# Patient Record
Sex: MALE
[De-identification: names, ages, dates, MRNs, and addresses within clinical notes are randomized per-mention and may not be internally consistent; named-entity substitution may affect disease eponyms.]

---

## 2024-03-26 ENCOUNTER — APPOINTMENT (OUTPATIENT)
Dept: OPHTHALMOLOGY | Facility: CLINIC | Age: 64
End: 2024-03-26
Payer: MEDICAID

## 2024-03-26 ENCOUNTER — NON-APPOINTMENT (OUTPATIENT)
Age: 64
End: 2024-03-26

## 2024-03-26 ENCOUNTER — EMERGENCY (EMERGENCY)
Facility: HOSPITAL | Age: 64
LOS: 1 days | Discharge: LEFT BEFORE DISCHARGE | End: 2024-03-26
Attending: EMERGENCY MEDICINE
Payer: COMMERCIAL

## 2024-03-26 VITALS
HEIGHT: 69 IN | HEART RATE: 52 BPM | DIASTOLIC BLOOD PRESSURE: 99 MMHG | TEMPERATURE: 98 F | WEIGHT: 197.98 LBS | SYSTOLIC BLOOD PRESSURE: 157 MMHG | OXYGEN SATURATION: 100 % | RESPIRATION RATE: 18 BRPM

## 2024-03-26 LAB
ALBUMIN SERPL ELPH-MCNC: 3.8 G/DL — SIGNIFICANT CHANGE UP (ref 3.3–5)
ALP SERPL-CCNC: 83 U/L — SIGNIFICANT CHANGE UP (ref 40–120)
ALT FLD-CCNC: 19 U/L — SIGNIFICANT CHANGE UP (ref 10–45)
ANION GAP SERPL CALC-SCNC: 11 MMOL/L — SIGNIFICANT CHANGE UP (ref 5–17)
APTT BLD: 29.1 SEC — SIGNIFICANT CHANGE UP (ref 24.5–35.6)
AST SERPL-CCNC: 20 U/L — SIGNIFICANT CHANGE UP (ref 10–40)
BASOPHILS # BLD AUTO: 0.11 K/UL — SIGNIFICANT CHANGE UP (ref 0–0.2)
BASOPHILS NFR BLD AUTO: 1.8 % — SIGNIFICANT CHANGE UP (ref 0–2)
BILIRUB SERPL-MCNC: 0.5 MG/DL — SIGNIFICANT CHANGE UP (ref 0.2–1.2)
BUN SERPL-MCNC: 20 MG/DL — SIGNIFICANT CHANGE UP (ref 7–23)
CALCIUM SERPL-MCNC: 9.6 MG/DL — SIGNIFICANT CHANGE UP (ref 8.4–10.5)
CHLORIDE SERPL-SCNC: 103 MMOL/L — SIGNIFICANT CHANGE UP (ref 96–108)
CO2 SERPL-SCNC: 22 MMOL/L — SIGNIFICANT CHANGE UP (ref 22–31)
CREAT SERPL-MCNC: 1.14 MG/DL — SIGNIFICANT CHANGE UP (ref 0.5–1.3)
EGFR: 72 ML/MIN/1.73M2 — SIGNIFICANT CHANGE UP
EOSINOPHIL # BLD AUTO: 0.16 K/UL — SIGNIFICANT CHANGE UP (ref 0–0.5)
EOSINOPHIL NFR BLD AUTO: 2.6 % — SIGNIFICANT CHANGE UP (ref 0–6)
GLUCOSE SERPL-MCNC: 86 MG/DL — SIGNIFICANT CHANGE UP (ref 70–99)
HCT VFR BLD CALC: 46.5 % — SIGNIFICANT CHANGE UP (ref 39–50)
HGB BLD-MCNC: 14 G/DL — SIGNIFICANT CHANGE UP (ref 13–17)
INR BLD: 1 RATIO — SIGNIFICANT CHANGE UP (ref 0.85–1.18)
LYMPHOCYTES # BLD AUTO: 2.07 K/UL — SIGNIFICANT CHANGE UP (ref 1–3.3)
LYMPHOCYTES # BLD AUTO: 34.2 % — SIGNIFICANT CHANGE UP (ref 13–44)
MANUAL SMEAR VERIFICATION: SIGNIFICANT CHANGE UP
MCHC RBC-ENTMCNC: 22.3 PG — LOW (ref 27–34)
MCHC RBC-ENTMCNC: 30.1 GM/DL — LOW (ref 32–36)
MCV RBC AUTO: 74.2 FL — LOW (ref 80–100)
MONOCYTES # BLD AUTO: 0.85 K/UL — SIGNIFICANT CHANGE UP (ref 0–0.9)
MONOCYTES NFR BLD AUTO: 14 % — SIGNIFICANT CHANGE UP (ref 2–14)
NEUTROPHILS # BLD AUTO: 2.44 K/UL — SIGNIFICANT CHANGE UP (ref 1.8–7.4)
NEUTROPHILS NFR BLD AUTO: 40.4 % — LOW (ref 43–77)
NRBC # BLD: 1 /100 WBCS — HIGH (ref 0–0)
PLAT MORPH BLD: ABNORMAL
PLATELET # BLD AUTO: 173 K/UL — SIGNIFICANT CHANGE UP (ref 150–400)
POTASSIUM SERPL-MCNC: 4.3 MMOL/L — SIGNIFICANT CHANGE UP (ref 3.5–5.3)
POTASSIUM SERPL-SCNC: 4.3 MMOL/L — SIGNIFICANT CHANGE UP (ref 3.5–5.3)
PROT SERPL-MCNC: 6.6 G/DL — SIGNIFICANT CHANGE UP (ref 6–8.3)
PROTHROM AB SERPL-ACNC: 10.5 SEC — SIGNIFICANT CHANGE UP (ref 9.5–13)
RBC # BLD: 6.27 M/UL — HIGH (ref 4.2–5.8)
RBC # FLD: 16 % — HIGH (ref 10.3–14.5)
RBC BLD AUTO: SIGNIFICANT CHANGE UP
SODIUM SERPL-SCNC: 136 MMOL/L — SIGNIFICANT CHANGE UP (ref 135–145)
TROPONIN T, HIGH SENSITIVITY RESULT: 7 NG/L — SIGNIFICANT CHANGE UP (ref 0–51)
VARIANT LYMPHS # BLD: 7 % — HIGH (ref 0–6)
WBC # BLD: 6.04 K/UL — SIGNIFICANT CHANGE UP (ref 3.8–10.5)
WBC # FLD AUTO: 6.04 K/UL — SIGNIFICANT CHANGE UP (ref 3.8–10.5)

## 2024-03-26 PROCEDURE — 71045 X-RAY EXAM CHEST 1 VIEW: CPT | Mod: 26

## 2024-03-26 PROCEDURE — 84484 ASSAY OF TROPONIN QUANT: CPT

## 2024-03-26 PROCEDURE — 99204 OFFICE O/P NEW MOD 45 MIN: CPT

## 2024-03-26 PROCEDURE — 93005 ELECTROCARDIOGRAM TRACING: CPT

## 2024-03-26 PROCEDURE — 70498 CT ANGIOGRAPHY NECK: CPT | Mod: MC

## 2024-03-26 PROCEDURE — 0042T: CPT

## 2024-03-26 PROCEDURE — 85610 PROTHROMBIN TIME: CPT

## 2024-03-26 PROCEDURE — 92083 EXTENDED VISUAL FIELD XM: CPT

## 2024-03-26 PROCEDURE — 85025 COMPLETE CBC W/AUTO DIFF WBC: CPT

## 2024-03-26 PROCEDURE — 70498 CT ANGIOGRAPHY NECK: CPT | Mod: 26,MC

## 2024-03-26 PROCEDURE — 99285 EMERGENCY DEPT VISIT HI MDM: CPT

## 2024-03-26 PROCEDURE — 99285 EMERGENCY DEPT VISIT HI MDM: CPT | Mod: 25

## 2024-03-26 PROCEDURE — 80053 COMPREHEN METABOLIC PANEL: CPT

## 2024-03-26 PROCEDURE — 70450 CT HEAD/BRAIN W/O DYE: CPT | Mod: 26,MC

## 2024-03-26 PROCEDURE — 71045 X-RAY EXAM CHEST 1 VIEW: CPT

## 2024-03-26 PROCEDURE — 92133 CPTRZD OPH DX IMG PST SGM ON: CPT

## 2024-03-26 PROCEDURE — 70450 CT HEAD/BRAIN W/O DYE: CPT | Mod: MC

## 2024-03-26 PROCEDURE — 70496 CT ANGIOGRAPHY HEAD: CPT | Mod: 26,MC

## 2024-03-26 PROCEDURE — 85730 THROMBOPLASTIN TIME PARTIAL: CPT

## 2024-03-26 PROCEDURE — 70496 CT ANGIOGRAPHY HEAD: CPT | Mod: MC

## 2024-03-26 RX ORDER — SODIUM CHLORIDE 9 MG/ML
1000 INJECTION INTRAMUSCULAR; INTRAVENOUS; SUBCUTANEOUS ONCE
Refills: 0 | Status: COMPLETED | OUTPATIENT
Start: 2024-03-26 | End: 2024-03-26

## 2024-03-26 RX ADMIN — SODIUM CHLORIDE 1000 MILLILITER(S): 9 INJECTION INTRAMUSCULAR; INTRAVENOUS; SUBCUTANEOUS at 22:30

## 2024-03-26 NOTE — ED PROVIDER NOTE - RAPID ASSESSMENT
63-year-old male sent in from Karsten Campa's office for stroke workup.  Patient has been dealing with a left eye visual disturbance for over a month now had seen Optho was cleared and recommended to neuro-ophthalmology who he saw today before he came into the emergency department.  No other focal deficit no numbness no weakness no fevers no chills never had anything like this before no recent imaging of the brain.  Patient was rapidly assessed via a telemedicine and/or role of Quick Triage Doctor; a limited history, physical exam and assessment was performed. The patient will be seen and further evaluated in the main emergency department. The remainder of care and evaluation will be conducted by the primary emergency medicine team. Receiving team will follow up on labs, imaging and serially reassess patient as indicated. All further decisions regarding patient care, evaluation and disposition are at the discretion of the receiving primary emergency department team. 63-year-old male sent in from Karsten Campa's office for stroke workup.  Patient has been dealing with a left eye visual disturbance for over a month now had seen Optho was cleared and recommended to neuro-ophthalmology who he saw today before he came into the emergency department.  No other focal deficit no numbness no weakness no fevers no chills never had anything like this before no recent imaging of the brain.  Patient was rapidly assessed via a telemedicine and/or role of Quick Triage Doctor; a limited history, physical exam and assessment was performed. The patient will be seen and further evaluated in the main emergency department. The remainder of care and evaluation will be conducted by the primary emergency medicine team. Receiving team will follow up on labs, imaging and serially reassess patient as indicated. All further decisions regarding patient care, evaluation and disposition are at the discretion of the receiving primary emergency department team.    Ainsley Juarez MD (Attending): I was available for real-time consultation while the patient was evaluated by the PA/NP in the waiting room as part of a triage screening process but did not directly see or examine the patient at this time. 63-year-old male sent in from Karsten Campa's office for stroke workup.  Patient has been dealing with a left eye visual disturbance for over a month now had seen Optho was cleared and recommended to neuro-ophthalmology who he saw today before he came into the emergency department.  No other focal deficit no numbness no weakness no fevers no chills never had anything like this before no recent imaging of the brain.  Patient was rapidly assessed via a telemedicine and/or role of Quick Triage Doctor; a limited history, physical exam and assessment was performed. The patient will be seen and further evaluated in the main emergency department. The remainder of care and evaluation will be conducted by the primary emergency medicine team. Receiving team will follow up on labs, imaging and serially reassess patient as indicated. All further decisions regarding patient care, evaluation and disposition are at the discretion of the receiving primary emergency department team.    Ainsley Juarez MD (Attending): I was available for real-time consultation while the patient was evaluated by the PA/NP in the waiting room as part of a triage screening process but did not directly see or examine the patient at this time. I was called by radiology at 939pm regarding CTH results, which were interpreted while patient was still in the waiting room. CT imaging concerning for hypoperfusion in R PCA territory with possible penumbra 102cc but no core infarct, diminuitive appearing R PCA and severely stenotic R M2, multiple areas of age-indeterminate infarcts. Patient subsequently evaluated by myself in the waiting room, well-appearing, no acute distress. Hx HTN, endorsing dark spots in vision over last few weeks but intermittent, currently asymptomatic with normal vision. Denies slurred speech, facial droop, focal weakness/paresthesias. Paperwork reviewed, sent to ED due to concern for L homonymous hemianopia. No dysarthria, facial droop, pronator drift, upper or lower extremity weakness on exam. Out of the window for TNK, exam/complaints not c/w LVO therefore stroke activation not indicated. Expedited to be brought back to the main ED.

## 2024-03-26 NOTE — ED PROVIDER NOTE - ATTENDING APP SHARED VISIT CONTRIBUTION OF CARE
Attending MD Khadijah Moreland:  I personally made/approved the management plan and take responsibility for the patient management.  Physician assistant note reviewed and agree on plan of care and except where noted.  See HPI, PE, and MDM for details.

## 2024-03-26 NOTE — ED PROVIDER NOTE - OBJECTIVE STATEMENT
Attending Khadijah Moreland: 63-year-old male with history of high blood pressure concerning with concern for blurry vision.  Patient states symptoms started approximately 1 month ago.  Patient was trying to see his doctor finally got into see Dr. Campa today.  Was told that he has a bilateral hemianopsia and sent to the emergency department for further evaluation.  Patient denies any falls or trauma.  Denies any numbness or tingling.  Has a history of high blood pressure but is not currently taking any medication.  No black or bloody stools.

## 2024-03-26 NOTE — ED ADULT NURSE NOTE - OBJECTIVE STATEMENT
63y M BIB self from home p/w left eye visual disturbance, blurry vision. Pt is axo4, ambulates independently at baseline. PMH of HTN.  Patient states symptoms started approximately 1 month ago. Patient was trying to see his doctor finally got into see an ophthalmologist today. Was told to go to the ED for further eval. Denies headache, dizziness, chest pain, shortness of breath, abdominal pain, nausea, vomiting, diarrhea, fevers, chills, dysuria, hematuria, recent illness travel or fall. Patient undressed and placed into gown, call bell in hand and side rails up with bed in lowest position for safety. blanket provided. Comfort and safety provided.

## 2024-03-26 NOTE — ED PROVIDER NOTE - PATIENT PORTAL LINK FT
You can access the FollowMyHealth Patient Portal offered by [facility] by registering at the following website: [email or web address]. By joining [facility]’s FollowMyHealth portal, you will also be able to view your health information using other applications (apps) compatible with our system.

## 2024-03-26 NOTE — ED PROVIDER NOTE - NSFOLLOWUPINSTRUCTIONS_ED_ALL_ED_FT
1. It is important to follow up with your primary care doctor in 1-2 days    follow up with neurology In the next 1-2 days     2. bring a copy of all your results to your follow up appointments    3. if your symptoms worsen, persist, or if any new symptoms develop, or if you experience any signs of distress, return to the ER right away.

## 2024-03-26 NOTE — ED ADULT NURSE NOTE - NSFALLUNIVINTERV_ED_ALL_ED
Bed/Stretcher in lowest position, wheels locked, appropriate side rails in place/Call bell, personal items and telephone in reach/Instruct patient to call for assistance before getting out of bed/chair/stretcher/Non-slip footwear applied when patient is off stretcher/[place] to call system/Physically safe environment - no spills, clutter or unnecessary equipment/Purposeful proactive rounding/Room/bathroom lighting operational, light cord in reach

## 2024-03-26 NOTE — ED PROVIDER NOTE - PROGRESS NOTE DETAILS
Attending Khadijah Moreland: neurology paged Shereen Merino PA-C: patient states he wants to sign out AMA. refusing to stay. dangers and risk of leaving reviewed and discussed with patient. aware of possibility of worsening stroke, new strokes, heart attack, change in quality of life, death. patient with full capacity. states wants to leave. discussed with ED attending. Attending Khadijah Moreland: pt seen by neuro who recommended admission. d/w pt who is requestint to leave AMA> pt does not want to be admitted. explained to patient concern for stroke and need for further work up. pt understands ED and neuro concern and wants to go home. able to verbnalize risk of death, further stroke and worsening vision. pt understands. pt's mother and fiance at bedside who tried to [id number] patient to stay however pt does not want to. recommended starting baby aspirin. stressed to patient the importance of following up with neuro and can return at anytime for further work up Attending Khadijah Moreland: neurology paged as concern for possible infarcts

## 2024-03-26 NOTE — ED PROVIDER NOTE - CLINICAL SUMMARY MEDICAL DECISION MAKING FREE TEXT BOX
Attending Khadijah Moreland: 64 yo male presenting with vision changes that began approximately 1 month ago. saw neuro optho today who sent patient to [facility]. upon arrival pt awake and alert. symptoms started 1 month ago therefore out of window for tenectaplase. concern for possible cva. reviewed optho testing from today. no evidence of glaucoma or retinal detachment. will obtain ct scans to further evaluate. consider neuro eval. Attending Khadijah Moreland: 62 yo male presenting with vision changes that began approximately 1 month ago. saw neuro optho today who sent patient to [facility]. upon arrival pt awake and alert. symptoms started 1 month ago therefore out of window for tenectaplase. concern for possible cva. reviewed optho testing from today. no evidence of glaucoma or retinal detachment. on phyicial exam pt is awake and alert following commands. perrla, eomi, pt with peripheral loss of vision, bilateral hemianopsia, no sensaory changes and strength 5/5. pt ambulating without difficulty with clear speech. will obtain ct of the head as well as cta head and necks to further evaluate and d/w neuro

## 2024-04-01 ENCOUNTER — INPATIENT (INPATIENT)
Facility: HOSPITAL | Age: 64
LOS: 0 days | Discharge: HOME | DRG: 72 | End: 2024-04-02
Attending: PSYCHIATRY & NEUROLOGY | Admitting: PSYCHIATRY & NEUROLOGY
Payer: COMMERCIAL

## 2024-04-01 VITALS
OXYGEN SATURATION: 100 % | HEIGHT: 69 IN | DIASTOLIC BLOOD PRESSURE: 117 MMHG | SYSTOLIC BLOOD PRESSURE: 192 MMHG | HEART RATE: 59 BPM | TEMPERATURE: 99 F | WEIGHT: 190.92 LBS | RESPIRATION RATE: 18 BRPM

## 2024-04-01 PROCEDURE — 99285 EMERGENCY DEPT VISIT HI MDM: CPT

## 2024-04-01 NOTE — ED ADULT TRIAGE NOTE - CHIEF COMPLAINT QUOTE
64 yo M pt seeing spots xs 1 month recently diagnosed with cataracts and glaucoma. pt had a CT here 3/26 and pt was suppose to stay for a MRI but signed himself out. pt denies HA, unilateral weakness, difficulty speaking and any current visual changes.

## 2024-04-02 ENCOUNTER — TRANSCRIPTION ENCOUNTER (OUTPATIENT)
Age: 64
End: 2024-04-02

## 2024-04-02 ENCOUNTER — RESULT REVIEW (OUTPATIENT)
Age: 64
End: 2024-04-02

## 2024-04-02 VITALS
RESPIRATION RATE: 18 BRPM | DIASTOLIC BLOOD PRESSURE: 95 MMHG | OXYGEN SATURATION: 99 % | SYSTOLIC BLOOD PRESSURE: 150 MMHG

## 2024-04-02 DIAGNOSIS — I63.9 CEREBRAL INFARCTION, UNSPECIFIED: ICD-10-CM

## 2024-04-02 PROBLEM — Z00.00 ENCOUNTER FOR PREVENTIVE HEALTH EXAMINATION: Status: ACTIVE | Noted: 2024-04-02

## 2024-04-02 LAB
A1C WITH ESTIMATED AVERAGE GLUCOSE RESULT: 5.6 % — SIGNIFICANT CHANGE UP (ref 4–5.6)
ALBUMIN SERPL ELPH-MCNC: 4.1 G/DL — SIGNIFICANT CHANGE UP (ref 3.3–5)
ALP SERPL-CCNC: 93 U/L — SIGNIFICANT CHANGE UP (ref 40–120)
ALT FLD-CCNC: 19 U/L — SIGNIFICANT CHANGE UP (ref 10–45)
ANION GAP SERPL CALC-SCNC: 12 MMOL/L — SIGNIFICANT CHANGE UP (ref 5–17)
APTT BLD: 31.4 SEC — SIGNIFICANT CHANGE UP (ref 24.5–35.6)
AST SERPL-CCNC: 25 U/L — SIGNIFICANT CHANGE UP (ref 10–40)
BASOPHILS # BLD AUTO: 0 K/UL — SIGNIFICANT CHANGE UP (ref 0–0.2)
BASOPHILS NFR BLD AUTO: 0 % — SIGNIFICANT CHANGE UP (ref 0–2)
BILIRUB SERPL-MCNC: 0.6 MG/DL — SIGNIFICANT CHANGE UP (ref 0.2–1.2)
BUN SERPL-MCNC: 16 MG/DL — SIGNIFICANT CHANGE UP (ref 7–23)
CALCIUM SERPL-MCNC: 9.1 MG/DL — SIGNIFICANT CHANGE UP (ref 8.4–10.5)
CHLORIDE SERPL-SCNC: 103 MMOL/L — SIGNIFICANT CHANGE UP (ref 96–108)
CO2 SERPL-SCNC: 24 MMOL/L — SIGNIFICANT CHANGE UP (ref 22–31)
CREAT SERPL-MCNC: 1.13 MG/DL — SIGNIFICANT CHANGE UP (ref 0.5–1.3)
EGFR: 73 ML/MIN/1.73M2 — SIGNIFICANT CHANGE UP
EOSINOPHIL # BLD AUTO: 0.21 K/UL — SIGNIFICANT CHANGE UP (ref 0–0.5)
EOSINOPHIL NFR BLD AUTO: 3.5 % — SIGNIFICANT CHANGE UP (ref 0–6)
ESTIMATED AVERAGE GLUCOSE: 114 MG/DL — SIGNIFICANT CHANGE UP (ref 68–114)
GIANT PLATELETS BLD QL SMEAR: PRESENT — SIGNIFICANT CHANGE UP
GLUCOSE SERPL-MCNC: 111 MG/DL — HIGH (ref 70–99)
HCT VFR BLD CALC: 50.1 % — HIGH (ref 39–50)
HGB BLD-MCNC: 14.9 G/DL — SIGNIFICANT CHANGE UP (ref 13–17)
INR BLD: 0.96 RATIO — SIGNIFICANT CHANGE UP (ref 0.85–1.18)
LYMPHOCYTES # BLD AUTO: 2.94 K/UL — SIGNIFICANT CHANGE UP (ref 1–3.3)
LYMPHOCYTES # BLD AUTO: 49.6 % — HIGH (ref 13–44)
MANUAL SMEAR VERIFICATION: SIGNIFICANT CHANGE UP
MCHC RBC-ENTMCNC: 21.9 PG — LOW (ref 27–34)
MCHC RBC-ENTMCNC: 29.7 GM/DL — LOW (ref 32–36)
MCV RBC AUTO: 73.6 FL — LOW (ref 80–100)
MONOCYTES # BLD AUTO: 0.52 K/UL — SIGNIFICANT CHANGE UP (ref 0–0.9)
MONOCYTES NFR BLD AUTO: 8.7 % — SIGNIFICANT CHANGE UP (ref 2–14)
NEUTROPHILS # BLD AUTO: 2.26 K/UL — SIGNIFICANT CHANGE UP (ref 1.8–7.4)
NEUTROPHILS NFR BLD AUTO: 38.2 % — LOW (ref 43–77)
PLAT MORPH BLD: NORMAL — SIGNIFICANT CHANGE UP
PLATELET # BLD AUTO: 188 K/UL — SIGNIFICANT CHANGE UP (ref 150–400)
POTASSIUM SERPL-MCNC: 4.1 MMOL/L — SIGNIFICANT CHANGE UP (ref 3.5–5.3)
POTASSIUM SERPL-SCNC: 4.1 MMOL/L — SIGNIFICANT CHANGE UP (ref 3.5–5.3)
PROT SERPL-MCNC: 6.8 G/DL — SIGNIFICANT CHANGE UP (ref 6–8.3)
PROTHROM AB SERPL-ACNC: 10.6 SEC — SIGNIFICANT CHANGE UP (ref 9.5–13)
RBC # BLD: 6.81 M/UL — HIGH (ref 4.2–5.8)
RBC # FLD: 17.2 % — HIGH (ref 10.3–14.5)
RBC BLD AUTO: SIGNIFICANT CHANGE UP
SODIUM SERPL-SCNC: 139 MMOL/L — SIGNIFICANT CHANGE UP (ref 135–145)
WBC # BLD: 5.92 K/UL — SIGNIFICANT CHANGE UP (ref 3.8–10.5)
WBC # FLD AUTO: 5.92 K/UL — SIGNIFICANT CHANGE UP (ref 3.8–10.5)

## 2024-04-02 PROCEDURE — 92523 SPEECH SOUND LANG COMPREHEN: CPT

## 2024-04-02 PROCEDURE — 76376 3D RENDER W/INTRP POSTPROCES: CPT

## 2024-04-02 PROCEDURE — 85025 COMPLETE CBC W/AUTO DIFF WBC: CPT

## 2024-04-02 PROCEDURE — 93306 TTE W/DOPPLER COMPLETE: CPT | Mod: 26

## 2024-04-02 PROCEDURE — 70551 MRI BRAIN STEM W/O DYE: CPT | Mod: MC

## 2024-04-02 PROCEDURE — 97165 OT EVAL LOW COMPLEX 30 MIN: CPT

## 2024-04-02 PROCEDURE — 85610 PROTHROMBIN TIME: CPT

## 2024-04-02 PROCEDURE — 80053 COMPREHEN METABOLIC PANEL: CPT

## 2024-04-02 PROCEDURE — 85730 THROMBOPLASTIN TIME PARTIAL: CPT

## 2024-04-02 PROCEDURE — 99223 1ST HOSP IP/OBS HIGH 75: CPT

## 2024-04-02 PROCEDURE — 83036 HEMOGLOBIN GLYCOSYLATED A1C: CPT

## 2024-04-02 PROCEDURE — 76376 3D RENDER W/INTRP POSTPROCES: CPT | Mod: 26

## 2024-04-02 PROCEDURE — 93306 TTE W/DOPPLER COMPLETE: CPT

## 2024-04-02 PROCEDURE — 93356 MYOCRD STRAIN IMG SPCKL TRCK: CPT

## 2024-04-02 PROCEDURE — 97161 PT EVAL LOW COMPLEX 20 MIN: CPT

## 2024-04-02 PROCEDURE — 99285 EMERGENCY DEPT VISIT HI MDM: CPT | Mod: 25

## 2024-04-02 RX ORDER — ASPIRIN/CALCIUM CARB/MAGNESIUM 324 MG
1 TABLET ORAL
Refills: 0
Start: 2024-04-02

## 2024-04-02 RX ORDER — ASPIRIN/CALCIUM CARB/MAGNESIUM 324 MG
81 TABLET ORAL DAILY
Refills: 0 | Status: DISCONTINUED | OUTPATIENT
Start: 2024-04-02 | End: 2024-04-02

## 2024-04-02 RX ORDER — ASPIRIN/CALCIUM CARB/MAGNESIUM 324 MG
1 TABLET ORAL
Qty: 0 | Refills: 0 | DISCHARGE
Start: 2024-04-02

## 2024-04-02 RX ORDER — ATORVASTATIN CALCIUM 80 MG/1
1 TABLET, FILM COATED ORAL
Qty: 30 | Refills: 3
Start: 2024-04-02 | End: 2024-07-30

## 2024-04-02 RX ORDER — ENOXAPARIN SODIUM 100 MG/ML
40 INJECTION SUBCUTANEOUS EVERY 24 HOURS
Refills: 0 | Status: DISCONTINUED | OUTPATIENT
Start: 2024-04-02 | End: 2024-04-02

## 2024-04-02 RX ORDER — ATORVASTATIN CALCIUM 80 MG/1
80 TABLET, FILM COATED ORAL AT BEDTIME
Refills: 0 | Status: DISCONTINUED | OUTPATIENT
Start: 2024-04-02 | End: 2024-04-02

## 2024-04-02 RX ORDER — ASPIRIN/CALCIUM CARB/MAGNESIUM 324 MG
1 TABLET ORAL
Qty: 30 | Refills: 3
Start: 2024-04-02 | End: 2024-07-30

## 2024-04-02 RX ORDER — ATORVASTATIN CALCIUM 80 MG/1
1 TABLET, FILM COATED ORAL
Refills: 0
Start: 2024-04-02

## 2024-04-02 RX ORDER — ATORVASTATIN CALCIUM 80 MG/1
1 TABLET, FILM COATED ORAL
Qty: 0 | Refills: 0 | DISCHARGE
Start: 2024-04-02

## 2024-04-02 RX ADMIN — Medication 81 MILLIGRAM(S): at 14:51

## 2024-04-02 NOTE — ED ADULT NURSE NOTE - CHIEF COMPLAINT QUOTE
62 yo M pt seeing spots xs 1 month recently diagnosed with cataracts and glaucoma. pt had a CT here 3/26 and pt was suppose to stay for a MRI but signed himself out. pt denies HA, unilateral weakness, difficulty speaking and any current visual changes.

## 2024-04-02 NOTE — DISCHARGE NOTE PROVIDER - HOSPITAL COURSE
Patient MICHAEL CARRASQUILLO is a 63y (1960) RIGHT handed man who presents to [facility] ED on 4/1/2024, with c/o vision disturbance. PMH significant for HTN (non-compliant with medications), glaucoma, cataracts. Patient previously presented to [facility] ED on 3/26/2024, at behest of Dr. Campa, for stroke w/u, after it was noted at an outpatient visit that patient had LHH. Patient underwent CTH and CTA H/N, which showed a RIGHT occipital infarct, as well as numerous chronic b/l infarcts. Patient also with significant ICAD. I attempted to complete neurology evaluation, but patient refused further evaluation and did not want to be admitted. Left AMA. On 4/2, patient returns and says he is willing to stay for stroke workup. Patient got MRI brain, which showed old infarctions.      Radiology imaging:  MR Head No Cont (04.02.24)   Parenchymal volume loss is seen. Abnormal T2 prolongation in the periventricular white matter region is identified which likely related to chronic microvascular ischemic change  Bilateral more prominent area of T2 prolongation involving the high left frontal subcortical region is seen which likely compatible with area of gliosis secondary to an old infarct. There is also evidence of abnormal signal seen involving the medial right parietal occipital cortex seen which likely compatible with an old infarct as well.  No acute hemorrhage mass or mass effect is seen. Evaluation of the diffusion weighted sequence demonstrates no abnormal areas of restricted diffusion to suggest acute infarct.  The large vessels demonstrate normal flow voids. Minimal opacification of the right ethmoid sinus is seen.Both mastoid and middle ear are clear.    IMPRESSION: Extensive parenchymal volume loss and chronic microvessel ischemic changes. Old infarcts as described above.      Patient also was evaluated by PT and OT, which recommended no skill PT/OT needs.        Patient MICHAEL CARRASQUILLO is a 63y (1960) RIGHT handed man who presents to [facility] ED on 4/1/2024, with c/o vision disturbance. PMH significant for HTN (non-compliant with medications), glaucoma, cataracts. Patient previously presented to [facility] ED on 3/26/2024, at behest of Dr. Campa, for stroke w/u, after it was noted at an outpatient visit that patient had LHH. Patient underwent CTH and CTA H/N, which showed a RIGHT occipital infarct, as well as numerous chronic b/l infarcts. Patient also with significant ICAD. I attempted to complete neurology evaluation, but patient refused further evaluation and did not want to be admitted. Left AMA. On 4/2, patient returns and says he is willing to stay for stroke workup. Patient got MRI brain, which showed old infarctions.      Radiology imaging:  MR Head No Cont (04.02.24)   Parenchymal volume loss is seen. Abnormal T2 prolongation in the periventricular white matter region is identified which likely related to chronic microvascular ischemic change  Bilateral more prominent area of T2 prolongation involving the high left frontal subcortical region is seen which likely compatible with area of gliosis secondary to an old infarct. There is also evidence of abnormal signal seen involving the medial right parietal occipital cortex seen which likely compatible with an old infarct as well.  No acute hemorrhage mass or mass effect is seen. Evaluation of the diffusion weighted sequence demonstrates no abnormal areas of restricted diffusion to suggest acute infarct.  The large vessels demonstrate normal flow voids. Minimal opacification of the right ethmoid sinus is seen.Both mastoid and middle ear are clear.    IMPRESSION: Extensive parenchymal volume loss and chronic microvessel ischemic changes. Old infarcts as described above.      Patient also was evaluated by PT and OT, which recommended no skill PT/OT needs. Patient is also evaluated by cardiology Dr. Hernandez. Patient will need to follow up with cardiologist outpatient and get a loop recorder placement ( to evaluate for cardiac arrythmia) outpatient in 1 week. Patient will need to follow up with a neurologist outpatient in 1 week. Patient is stable for discharge.       Patient MICHAEL CARRASQUILLO is a 63y (1960) RIGHT handed man who presents to [facility] ED on 4/1/2024, with c/o vision disturbance. PMH significant for HTN (non-compliant with medications), glaucoma, cataracts. Patient previously presented to [facility] ED on 3/26/2024, at behest of Dr. Campa, for stroke w/u, after it was noted at an outpatient visit that patient had LHH. Patient underwent CTH and CTA H/N, which showed a RIGHT occipital infarct, as well as numerous chronic b/l infarcts. Patient also with significant ICAD. I attempted to complete neurology evaluation, but patient refused further evaluation and did not want to be admitted. Left AMA. On 4/2, patient returns and says he is willing to stay for stroke workup. Patient got MRI brain, which showed old infarctions.      Radiology imaging:  MR Head No Cont (04.02.24)   Parenchymal volume loss is seen. Abnormal T2 prolongation in the periventricular white matter region is identified which likely related to chronic microvascular ischemic change  Bilateral more prominent area of T2 prolongation involving the high left frontal subcortical region is seen which likely compatible with area of gliosis secondary to an old infarct. There is also evidence of abnormal signal seen involving the medial right parietal occipital cortex seen which likely compatible with an old infarct as well.  No acute hemorrhage mass or mass effect is seen. Evaluation of the diffusion weighted sequence demonstrates no abnormal areas of restricted diffusion to suggest acute infarct.  The large vessels demonstrate normal flow voids. Minimal opacification of the right ethmoid sinus is seen.Both mastoid and middle ear are clear.    IMPRESSION: Extensive parenchymal volume loss and chronic microvessel ischemic changes. Old infarcts as described above.      Patient also was evaluated by PT and OT, which recommended no skill PT/OT needs. Patient is also evaluated by cardiology Dr. Hernandez. Patient will to follow up with cardiologist (Dr. Hernandez) outpatient and get a loop recorder placement ( to evaluate for cardiac arrythmia) outpatient in 1 week. Patient will need to follow up with a neurologist outpatient in 1 week. Patient is stable for discharge.

## 2024-04-02 NOTE — H&P ADULT - ASSESSMENT
MICHAEL CARRASQUILLO is a 63y (1960) RIGHT handed man, with PMH significant for: HTN (non-compliant with medications), glaucoma, cataracts who presents to [facility] ED on 4/1/2024, with c/o vision disturbance.    NIHSS on admission: 3 (+2 vision, +1 LUE ataxia)  LKN: Unknown - roughly 1 month ago  pre-MRS: 0    Impression:  1) H d/t R occipital lobe infarct; mechanism: ICAD  2) Multiple scattered hypoattenuating foci in the right splenium of the corpus callosum, right medial occipital lobe, left posterior frontal lobe, left corona radiata, right thalamus, left cerebellum, and right corona radiata. Mechanism pending workup - but likely ICAD.    Plan:  [] Admit to Stroke Floor  [] Telemetry monitoring  [] Dysphagia screen  [] SBP goal is normotension  [] Start aspirin 81mg  [] Start atorvastatin 80mg (goal LDL<70)  [] A1c, lipid panel  [] MRI brain w/o contrast  [] TTE with bubble study  [] ILR placement pending workup  [] Neurochecks, vitals q4h  [] Fall and aspiration precautions  [] PT/OT/SLP/CM  [] Diet: target is DASH/TLC  [] DVT prophylaxis: enoxaparin 40mg q24h (unless medically contraindicated) AND SCDs  [] Stroke education provided  [] Smoking cessation education if patient is a current smoker  [] Please document MRS on discharge    NO tenecteplase d/t outside therapeutic window.  NO thrombectomy d/t outside therapeutic window.    Patient/plan d/w Stroke fellow, Dr. Raoul Fischer, under the supervision of attending vascular neurologist Dr. Ramires. To be seen by attending in the AM with attestation to follow. Orders placed promptly upon admission. Note delayed d/t emergent patient care.

## 2024-04-02 NOTE — ED PROVIDER NOTE - PHYSICAL EXAMINATION
GENERAL: no acute distress, non-toxic appearing  HEAD: normocephalic, atraumatic  HEENT: normal conjunctiva, oral mucosa moist, neck supple  CARDIAC: regular rate and rhythm, normal S1 and S2,  no appreciable murmurs  PULM: clear to ascultation bilaterally, no crackles, rales, rhonchi, or wheezing  GI: abdomen nondistended, soft, nontender, no guarding or rebound tenderness  : no CVA tenderness, no suprapubic tenderness  NEURO: alert and oriented x 3, normal speech, PERRLA, EOMI, no focal motor or sensory deficits, nonantalgic gait, no visual field deficits, 20/20 vision bilaterally  MSK: no visible deformities, no peripheral edema, calf tenderness/redness/swelling  SKIN: no visible rashes, dry, well-perfused  PSYCH: appropriate mood and affect

## 2024-04-02 NOTE — PHYSICAL THERAPY INITIAL EVALUATION ADULT - PERTINENT HX OF CURRENT PROBLEM, REHAB EVAL
63-year-old male past medical history of hypertension presents with visual disturbances for the last month.  Patient presented to the ER on March 26 with the same complaints, received CT heads and was found to have multiple stenoses and was recommended stay.  Patient was seen by neuro at that time however patient chose to leave AMA. Patient has had no changes in symptoms. Currently does not have visual changes. Has not taken his BP meds for 5 years.

## 2024-04-02 NOTE — H&P ADULT - NSHPADDITIONALINFOADULT_GEN_ALL_CORE
Mr Goldsmith is a 64yo RH M with recent admission for stroke, was admitted by AMA without completing his strke workup, now returning for persistent stroke symptoms of vision disturbance without interval new stroke symptoms. Had CTH showing multiple small foci BL and anterior and posterior territory hypo density concerning subacute/ chronic stroke. Was kept overnight for MRI and additional stroke workup.    4/2 MRI showed no New acute diffusion restriction, but chronic prior stroke.  Pt can ambulate and placed on ASA 81mg. Had significant burden of ICAD.   Plan for TTE and Loop inpt or outpt with stroke neuro followup. He will also follow with Cardiology. Due to his LHH, he was recommended NOT to drive.   He will continue ASA 81mg and Lipitor 80 qD for secondary stroke prevention      Raoul Fischer MD PhD  Vascular neurology fellow
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
## 2024-04-02 NOTE — SPEECH LANGUAGE PATHOLOGY EVALUATION - COMMENTS
Patient had to wait 1 month to see Dr. Campa.  However, when he saw Dr. Campa, it was noted that he had a left homonymous hemianopsia.  Dr. Campa referred patient to the ED because there was concern for a stroke. When I saw patient last, he was extremely agitated and did not want to stay for an examination.  Patient tells me that he feels his eyes are better today. No new complaints.  He reports that his only medical problem (other than stroke) is hypertension, but he does not take any of his antihypertensive medications. Patient denies a history of cardiac arrhythmia.  His mother reports a history of brain aneurysm.  He denies knowing of a history of stroke or MI prior to his visual disturbance. Patient is not on any blood thinners or aspirin.  He does not use any assistive devices at baseline. He is able to climb stairs and he drives. Patient reports that he vapes.  Patient also reports that he drinks 10 bottles of beer per week.  He denies recreational drug use.  He lives with his fiancée. NIHSS on admission: 3 (+2 vision, +1 LUE ataxia). LKN: Unknown - roughly 1 month ago. pre-MRS: 0    Impression:  1) H d/t R occipital lobe infarct; mechanism: ICAD  2) Multiple scattered hypoattenuating foci in the right splenium of the corpus callosum, right medial occipital lobe, left posterior frontal lobe, left corona radiata, right thalamus, left cerebellum, and right corona radiata. Mechanism pending workup - but likely ICAD.    Imagin/02: MRI Brain: Extensive parenchymal volume loss and chronic microvessel ischemic changes. Bilateral more prominent area of T2 prolongation involving the high left frontal subcortical region is seen which likely compatible with area of gliosis secondary to an old infarct. There is also evidence of abnormal   signal seen involving the medial right parietal occipital cortex seen which likely compatible with an old infarct as well.    Passed dysphagia screen  @5:06.    **Not previously known to this service. Results d/w pt and stroke team. Pt able to adequately describe basic and abstract concepts in picture description task. Pt able to adequately organize and plan elements within clock drawing task. Subtest 6: Understanding Medicine Labels of Assessment of Language-Related Functional Activities (WEI) performed. The patient scored 10 out of 10. This indicates a high probability of independent functioning on this task.

## 2024-04-02 NOTE — SPEECH LANGUAGE PATHOLOGY EVALUATION - SLP GENERAL OBSERVATIONS
Pt encountered in bed, awake, alert, oriented x4, on room air. Pleasant and cooperative for purposes of exam. Speech intelligible, vocal intensity WFL for pt's age and gender. Pt denies recent changes in speech, language, cognition, or voice.

## 2024-04-02 NOTE — ED ADULT NURSE NOTE - NSFALLUNIVINTERV_ED_ALL_ED
Bed/Stretcher in lowest position, wheels locked, appropriate side rails in place/Call bell, personal items and telephone in reach/Instruct patient to call for assistance before getting out of bed/chair/stretcher/Non-slip footwear applied when patient is off stretcher/[place] to call system/Physically safe environment - no spills, clutter or unnecessary equipment/Purposeful proactive rounding/Room/bathroom lighting operational, light cord in reach

## 2024-04-02 NOTE — DISCHARGE NOTE PROVIDER - CARE PROVIDER_API CALL
Libman, Richard Benjamin  Neurology  [address]  [place] [address]  Phone: [phone]  Fax: [phone]  Follow Up Time: 1 week   Kamran Rose  Neurology  [address]  [place] [address]  Phone: [phone]  Fax: [phone]  Follow Up Time: 1 week    Velasquez Hernandez  [address], [address]  [place] [address]  Phone: [phone]  Fax: [phone]  Follow Up Time: 1 week

## 2024-04-02 NOTE — OCCUPATIONAL THERAPY INITIAL EVALUATION ADULT - LIVES WITH, PROFILE
pt lives in a private house with wife and a flight of steps to navigate. prior to admission, pt was independent in all ADLs and functional tasks without AE/spouse

## 2024-04-02 NOTE — ED PROVIDER NOTE - CLINICAL SUMMARY MEDICAL DECISION MAKING FREE TEXT BOX
64 yo M PMHx of HTN not on meds presents with visual disturbances 1 month not currently presents with a nonfocal neuro exam, CTs show multiple vessel occlusions but left AMA on last visit. Patient amenable to admission today. Will cs neuro, obtain repeat labs and admit. 62 yo M PMHx of HTN not on meds presents with visual disturbances 1 month not currently presents with a nonfocal neuro exam, CTs show multiple vessel occlusions but left AMA on last visit. Patient amenable to admission today. Will cs neuro, obtain repeat labs and admit.    Dr. Cervantes (Attending Physician)

## 2024-04-02 NOTE — DISCHARGE NOTE NURSING/CASE MANAGEMENT/SOCIAL WORK - PATIENT PORTAL LINK FT
You can access the FollowMyHealth Patient Portal offered by [facility] by registering at the following website: [email or web address]. By joining [employer]’s FollowMyHealth portal, you will also be able to view your health information using other applications (apps) compatible with our system.

## 2024-04-02 NOTE — H&P ADULT - NSHPLABSRESULTS_GEN_ALL_CORE
Labs:                        14.9   5.92  )-----------( 188      ( 02 Apr 2024 01:25 )             50.1     04-02    139  |  103  |  16  ----------------------------<  111<H>  4.1   |  24  |  1.13    Ca    9.1      02 Apr 2024 01:25    TPro  6.8  /  Alb  4.1  /  TBili  0.6  /  DBili  x   /  AST  25  /  ALT  19  /  AlkPhos  93  04-02    CAPILLARY BLOOD GLUCOSE        LIVER FUNCTIONS - ( 02 Apr 2024 01:25 )  Alb: 4.1 g/dL / Pro: 6.8 g/dL / ALK PHOS: 93 U/L / ALT: 19 U/L / AST: 25 U/L / GGT: x             PT/INR - ( 02 Apr 2024 01:25 )   PT: 10.6 sec;   INR: 0.96 ratio         PTT - ( 02 Apr 2024 01:25 )  PTT:31.4 sec  CSF:                  Radiology:  CT HEAD:  Multiple hypoattenuating foci in the brain, including the right medial   occipital lobe, as described above, most concerning for age-indeterminate   infarct. No acute intracranial hemorrhage or mass effect. Further workup   with brain MRI is suggested.    CT PERFUSION:  Technical limitations: None.    Core infarction: 0 ml  Penumbra / tissue at risk for active ischemia: 102 mL, predominantly in   the right PCA territory.    CTA NECK:  Noncalcified plaque resulting in approximately 50% stenosis at the left   proximal ICA. Patent bilateral vertebral arteries without flow limiting   stenosis or dissection.    CTA HEAD:  Multifocal severe stenosis versus occlusion of the right posterior   cerebral artery.    Additional area of critical stenosis/near complete occlusion of the right   M2 MCA and focal severe stenosis of the left M2.

## 2024-04-02 NOTE — SPEECH LANGUAGE PATHOLOGY EVALUATION - SLP PERTINENT HISTORY OF CURRENT PROBLEM
63y RIGHT handed man who presents to [facility] ED on 4/1/2024, with c/o vision disturbance. PMH significant for: HTN (non-compliant w/ medications), glaucoma, cataracts. Patient is accompanied by his mother and fiance. All are known to me. Patient previously presented to [facility] ED on 3/26/2024, at behest of Dr. Campa, for stroke w/u, after it was noted at an outpatient visit that patient had LHH. Patient underwent CTH and CTA H/N, which showed a RIGHT occipital infarct, as well as numerous chronic b/l infarcts. Patient also with significant ICAD. I attempted to complete neurology evaluation, but patient refused further evaluation and did not want to be admitted. Left AMA. Today, patient returns. Says he is willing to stay for stroke workup. Patient reports that roughly one month prior to 3/26/2024 - he woke up and noticed spots in his vision. Denies ever experiencing weakness, numbness, speech disturbance, gait instability.

## 2024-04-02 NOTE — H&P ADULT - NSHPPHYSICALEXAM_GEN_ALL_CORE
Vitals:  T(C): 37.1 (04-02-24 @ 05:32), Max: 37.3 (04-01-24 @ 21:03)  HR: 56 (04-02-24 @ 05:32) (54 - 59)  BP: 148/92 (04-02-24 @ 05:32) (148/92 - 192/117)  RR: 16 (04-02-24 @ 05:32) (16 - 18)  SpO2: 98% (04-02-24 @ 05:32) (98% - 100%)    Physical Examination:  General - Sitting up on ED cart, in NAD, cooperative today  Cardiovascular - No LE edema  Eyes - +Injected conjunctivae, anicteric sclerae    Neurologic Exam:  Mental status:  Awake, Alert; Oriented to: person and time, knows he is in the hospital but not which; Speech: fluent; Repetition and naming: intact; Follows simple and complex commands; Fund of knowledge: intact    Cranial nerves - PERRL, left homonymous hemianopsia, EOMI - no nystagmus, face sensation (V1-V3) intact b/l, facial strength intact without asymmetry b/l, hearing intact b/l with finger rub test, palate with symmetric elevation, shoulder shrug intact b/l, tongue midline on protrusion with full lateral movement  Dysarthria: not present    Motor - Normal bulk and tone throughout. No pronator drift.  Strength testing (R/L)  Deltoid:  5/5   Biceps:  5/5   Triceps:  5/5   Wrist Extension:  5/5   Wrist Flexion:  5/5   Interossei:  5/5   :  5/5  Hip Flexion:  5/5   Hip Extension:  5/5   Knee Flexion:  5/5   Knee Extension:  5/5   Dorsiflexion:  5/5   Plantar Flexion:  5/5    Sensation - Light touch intact throughout    DTRs (R/L)  Deferred d/t focused neurologic exam    Coordination - Finger to Nose with mild dysmetria in the LUE. HTS intact b/l. No tremors appreciated.    Gait and station - Did not assess d/t fall risk/safety concerns.

## 2024-04-02 NOTE — PHYSICAL THERAPY INITIAL EVALUATION ADULT - ADDITIONAL COMMENTS
Patient lives in private home with spouse, 3 steps to enter, 1 flight to bedroom. Patient independent with ADLs/IADLS, no DME, +works as

## 2024-04-02 NOTE — SPEECH LANGUAGE PATHOLOGY EVALUATION - SLP DIAGNOSIS
63yoM p/w visual disturbances for the last month, MRI brain with no acute infarct. Pt p/w functional expressive and receptive language skills. No apraxic or dysarthric motor speech component present. Pt able to follow multistep commands, answer simple and complex yes/no questions, and identify pictures/body parts. Verbal expression intact for automatic speech, repetition, and for basic conversation. Higher level medication management activity completed, which revealed intact cognitive-linguistic skills. Pt reports cognitive-communication skills are at baseline. Skilled ST services not indicated while pt is in house or upon discharge. This service will therefore sign off.

## 2024-04-02 NOTE — DISCHARGE NOTE PROVIDER - PROVIDER TOKENS
PROVIDER:[TOKEN:[[id number]],FOLLOWUP:[1 week]] PROVIDER:[TOKEN:[[id number]],FOLLOWUP:[1 week]],PROVIDER:[TOKEN:[[id number]],FOLLOWUP:[1 week]]

## 2024-04-02 NOTE — OCCUPATIONAL THERAPY INITIAL EVALUATION ADULT - PERTINENT HX OF CURRENT PROBLEM, REHAB EVAL
Patient MICHAEL CARRASQUILLO is a 63y (1960) RIGHT handed man who presents to [facility] ED on 4/1/2024, with c/o vision disturbance. PMH significant for: HTN (non-compliant with medications), glaucoma, cataracts. Patient is accompanied by his mother and fiance. Patient previously presented to [facility] ED on 3/26/2024, at behest of Dr. Campa, for stroke w/u, after it was noted at an outpatient visit that patient had LHH. Patient underwent CTH and CTA H/N, which showed a RIGHT occipital infarct, as well as numerous chronic b/l infarcts. Patient also with significant ICAD. Patient reports that roughly one month prior to 3/26/2024 - he woke up and noticed spots in his vision. Denies ever experiencing weakness, numbness, speech disturbance, gait instability.  Patient had to wait 1 month to see Dr. Campa.  However, when he saw Dr. Campa, it was noted that he had a left homonymous hemianopsia.  Dr. Campa referred patient to the emergency department because there was concern for a stroke.  When I saw patient last, he was extremely agitated and did not want to stay for an examination.  Patient tells me that he feels his eyes are better today. No new complaints.  He reports that his only medical problem (other than stroke) is hypertension, but he does not take any of his antihypertensive medications.   Patient denies a history of cardiac arrhythmia.  His mother reports a history of brain aneurysm.  He denies knowing of a history of stroke or MI prior to his visual disturbance.  Patient is not on any blood thinners or aspirin.  He does not use any assistive devices at baseline. He is able to climb stairs and he drives.  Patient reports that he vapes. Patient also reports that he drinks 10 bottles of beer per week.  He denies recreational drug use.  He lives with his fiancée.

## 2024-04-02 NOTE — CHART NOTE - NSCHARTNOTEFT_GEN_A_CORE
***Vascular Neurology Follow-up Chart Note***    Patient seen today with attending on rounds. Please also refer to attending addendum on initial H&P note dated 4/2/24    Exam stable except:   Left homonymous hemianopsia (mostly L superior field)  Finger to Nose with mild dysmetria in LUE    Impression:   1) Left homonymous hemianopia (most prominent deficit in L superior field) 2/2 R occipital lobe infarct; mechanism likely ICAD  2) Multiple scattered hypoattenuating foci in the right splenium of the corpus callosum, right medial occipital lobe, left posterior frontal lobe, left corona radiata, right thalamus, left cerebellum, and right corona radiata. Mechanism pending workup - but likely ICAD.    Plan:    [] MRI brain w/o contrast  [] TTE with bubble study  [] Continue aspirin 81mg for now. If MRI shows acute infarct, will recommend enrolling in CAPTIVA trial given ICAD  [] Atorvastatin 80mg (titrate to goal LDL<70)  [] A1c 5.6, lipid panel pending   [] ILR placement pending workup  [] Neurochecks, vitals q4h  [] Telemetry monitoring  [] PT/OT evaluated- no needs   [] Diet: Puree- goal is DASH/TLC  [] DVT prophylaxis: enoxaparin SC and SCDs  [] Counseled regarding smoking cessation education     SBP goal: Normotension    Case & Plan discussed with patient, all questions answered.       CORE MEASURES:         Admission NIHSS: 3 (+2 vision, +1 LUE ataxia)     ICH score: N/A     Hunt and Billy Score:  N/A     Tenecteplase: [] YES [x] NO     Statin Therapy: [x] YES [] NO     Smoking [x] YES [] NO     Afib [] YES [x] NO     Stroke Education [] YES [x] NO    Dysphagia screen : [x] Passed [] Failed- S&S pending [] Pending  (Ensure medications are ordered via appropriate route)    DVT ppx: Venodynes [] Heparin s.c [x] LMWH [] Not on chemical DVT ppx due to: ***Vascular Neurology Follow-up Chart Note***    Patient seen today with attending on rounds. Please also refer to attending addendum on initial H&P note dated 4/2/24    < from: MR Head No Cont (04.02.24 @ 12:47) >    Parenchymal volume loss is seen    Abnormal T2 prolongation in the periventricular white matter region is   identified which likely related to chronic microvascular ischemic change    Bilateral more prominent area of T2 prolongation involving the high left   frontal subcortical region is seen which likely compatible with area of   gliosis secondary to an old infarct. There is also evidence of abnormal   signal seen involving the medial right parietal occipital cortex seen   which likely compatible with an old infarct as well.    No acute hemorrhage mass or mass effect is seen.    Evaluation of the diffusion weighted sequence demonstrates no abnormal   areas of restricted diffusion to suggest acute infarct.    The large vessels demonstrate normal flow voids    Minimal opacification of the right ethmoid sinus is seen    Both mastoid and middle ear are clear.    IMPRESSION: Extensive parenchymal volume loss and chronic microvessel   ischemic changes.    Old infarcts as described above.      Exam stable except:   Left homonymous hemianopsia (mostly L superior field)  Finger to Nose with mild dysmetria in LUE    Impression:   1) Left homonymous hemianopia (most prominent deficit in L superior field) 2/2 R occipital lobe infarct; mechanism likely ICAD  2) Multiple scattered hypoattenuating foci in the right splenium of the corpus callosum, right medial occipital lobe, left posterior frontal lobe, left corona radiata, right thalamus, left cerebellum, and right corona radiata. Mechanism pending workup - but likely ICAD.    Plan:    [x] MRI brain w/o contrast with no acute infarctions or bleed. Old infarcts noted in left frontal subcortical and medial right parietal occipital cortex   [] TTE with bubble study  [] Continue aspirin 81mg for now. If MRI shows acute infarct, will recommend enrolling in CAPTIVA trial given ICAD  [] Atorvastatin 80mg (titrate to goal LDL<70)  [] A1c 5.6, lipid panel pending   [] ILR placement pending workup  [] Neurochecks, vitals q4h  [] Telemetry monitoring  [] PT/OT evaluated- no needs   [] Diet: Puree- goal is DASH/TLC  [] DVT prophylaxis: enoxaparin SC and SCDs  [] Counseled regarding smoking cessation education     SBP goal: Normotension    Case & Plan discussed with patient, all questions answered.       CORE MEASURES:         Admission NIHSS: 3 (+2 vision, +1 LUE ataxia)     ICH score: N/A     Hunt and Billy Score:  N/A     Tenecteplase: [] YES [x] NO     Statin Therapy: [x] YES [] NO     Smoking [x] YES [] NO     Afib [] YES [x] NO     Stroke Education [] YES [x] NO    Dysphagia screen : [x] Passed [] Failed- S&S pending [] Pending  (Ensure medications are ordered via appropriate route)    DVT ppx: Venodynes [] Heparin s.c [x] LMWH [] Not on chemical DVT ppx due to: ***Vascular Neurology Follow-up Chart Note***    Patient seen today with attending on rounds. Please also refer to attending addendum on initial H&P note dated 4/2/24    < from: MR Head No Cont (04.02.24 @ 12:47) >    Parenchymal volume loss is seen    Abnormal T2 prolongation in the periventricular white matter region is   identified which likely related to chronic microvascular ischemic change    Bilateral more prominent area of T2 prolongation involving the high left   frontal subcortical region is seen which likely compatible with area of   gliosis secondary to an old infarct. There is also evidence of abnormal   signal seen involving the medial right parietal occipital cortex seen   which likely compatible with an old infarct as well.    No acute hemorrhage mass or mass effect is seen.    Evaluation of the diffusion weighted sequence demonstrates no abnormal   areas of restricted diffusion to suggest acute infarct.    The large vessels demonstrate normal flow voids    Minimal opacification of the right ethmoid sinus is seen    Both mastoid and middle ear are clear.    IMPRESSION: Extensive parenchymal volume loss and chronic microvessel   ischemic changes.    Old infarcts as described above.      Exam stable except:   Left homonymous hemianopsia (mostly L superior field)  Finger to Nose with mild dysmetria in LUE    Impression:   1) Left homonymous hemianopia (most prominent deficit in L superior field) 2/2 R occipital lobe infarct; mechanism likely ICAD  2) Multiple scattered hypoattenuating foci in the right splenium of the corpus callosum, right medial occipital lobe, left posterior frontal lobe, left corona radiata, right thalamus, left cerebellum, and right corona radiata. Mechanism pending workup - but likely ICAD.    Plan:    [x] MRI brain w/o contrast with no acute infarctions or bleed. Old infarcts noted in left frontal subcortical and medial right parietal occipital cortex   [] TTE with bubble study pending   [] Continue aspirin 81mg for now.  [] Atorvastatin 80mg (titrate to goal LDL<70)  [] A1c 5.6, lipid panel pending   [] ILR placement pending workup  [] Neurochecks, vitals q4h  [] Telemetry monitoring  [] PT/OT evaluated- no needs   [] Diet: Puree- goal is DASH/TLC  [] DVT prophylaxis: enoxaparin SC and SCDs  [] Counseled regarding smoking cessation education     SBP goal: Normotension    Case & Plan discussed with patient, all questions answered.       CORE MEASURES:         Admission NIHSS: 3 (+2 vision, +1 LUE ataxia)     ICH score: N/A     Hunt and Billy Score:  N/A     Tenecteplase: [] YES [x] NO     Statin Therapy: [x] YES [] NO     Smoking [x] YES [] NO     Afib [] YES [x] NO     Stroke Education [] YES [x] NO    Dysphagia screen : [x] Passed [] Failed- S&S pending [] Pending  (Ensure medications are ordered via appropriate route)    DVT ppx: Venodynes [] Heparin s.c [x] LMWH [] Not on chemical DVT ppx due to:

## 2024-04-02 NOTE — DISCHARGE NOTE PROVIDER - NSDCCPCAREPLAN_GEN_ALL_CORE_FT
PRINCIPAL DISCHARGE DIAGNOSIS  Diagnosis: Stroke  Assessment and Plan of Treatment: You were admitted for stroke workup for recent right occipital infarction. Your MR brain shows chronic infarctions. You were started on medications aspirin 81mg and atorvastatin 80mg. Please continue take these medications and follow up with a stroke neurologist outpatient.   - Please follow up with neurologist in 1 week. The office will call you to schedule an appointment, if you do not hear from them please call [phone]. Continue taking medications as prescribed. If you were prescribed a statin for your cholesterol please make sure you have your liver enzymes checked with your primary care physician. Monitor your blood pressure. Reduce fat, cholesterol and salt in your diet. Increase intake of fruits and vegetables. Limit alcohol to minimum and do not smoke. You may be at risk for falling, make changes to your home to help you walk easier. Keep up to date on vaccinations.  If you experience any symptoms of facial drooping, slurred speech, arm or leg weakness, severe headache, vision changes or any worsening symptoms, notify provider immediately and return to ER.        SECONDARY DISCHARGE DIAGNOSES  Diagnosis: Hypertension  Assessment and Plan of Treatment:      PRINCIPAL DISCHARGE DIAGNOSIS  Diagnosis: Stroke  Assessment and Plan of Treatment: You were admitted for stroke workup for recent right occipital infarction. Your MR brain shows chronic infarctions. You were started on medications aspirin 81mg and atorvastatin 80mg. Please continue take these medications and follow up with a neurologist outpatient. Please also follow up with a cardiologist to get a loop recorder outpatient.   - Please follow up with neurologist in 1 week. The office will call you to schedule an appointment, if you do not hear from them please call [phone]. Continue taking medications as prescribed. If you were prescribed a statin for your cholesterol please make sure you have your liver enzymes checked with your primary care physician. Given visual deficit, please do not drive until cleared by a neurologist.   -Monitor your blood pressure. Reduce fat, cholesterol and salt in your diet. Increase intake of fruits and vegetables. Limit alcohol to minimum and do not smoke. You may be at risk for falling, make changes to your home to help you walk easier. Keep up to date on vaccinations.   - If you experience any symptoms of facial drooping, slurred speech, arm or leg weakness, severe headache, vision changes or any worsening symptoms, notify provider immediately and return to ER.        SECONDARY DISCHARGE DIAGNOSES  Diagnosis: Hypertension  Assessment and Plan of Treatment: please continue home medication and follow up with cardiologist.     PRINCIPAL DISCHARGE DIAGNOSIS  Diagnosis: Stroke  Assessment and Plan of Treatment: You were admitted for stroke workup for recent right occipital infarction. Your MR brain shows chronic infarctions. You were started on medications aspirin 81mg and atorvastatin 80mg. Please continue take these medications and follow up with a neurologist outpatient. Please also follow up with a cardiologist to get a loop recorder outpatient.   - Please follow up with neurologist in 1 week. The office will call you to schedule an appointment, if you do not hear from them please call [phone]. Continue taking medications as prescribed. If you were prescribed a statin for your cholesterol please make sure you have your liver enzymes checked with your primary care physician. Given visual deficit, please do not drive.  -Monitor your blood pressure. Reduce fat, cholesterol and salt in your diet. Increase intake of fruits and vegetables. Limit alcohol to minimum and do not smoke. You may be at risk for falling, make changes to your home to help you walk easier. Keep up to date on vaccinations.   - If you experience any symptoms of facial drooping, slurred speech, arm or leg weakness, severe headache, vision changes or any worsening symptoms, notify provider immediately and return to ER.        SECONDARY DISCHARGE DIAGNOSES  Diagnosis: Hypertension  Assessment and Plan of Treatment: please continue home medication and follow up with cardiologist.

## 2024-04-02 NOTE — DISCHARGE NOTE NURSING/CASE MANAGEMENT/SOCIAL WORK - NSDCPEFALRISK_GEN_ALL_CORE
For information on Fall & Injury Prevention, visit: [email or web address] OR  [email or web address] OR  https://www.cdc.gov/steadi/patient.html

## 2024-04-02 NOTE — H&P ADULT - ATTENDING COMMENTS
I reviewed available diagnostic studies, and reviewed images personally. I agree with resident & fellow 's history, exam, orders placed, and plan of care. Pt decided to come in as he has had symptoms for >1 motnhs now and felt he needed to complete work up.  Chronic cerebral infarction, >1 month ago based on clinical hx, start aspirin, atorvastatin as above, loop outpt with Dr. Randle, and follow up in clinic. Need to follow with PCP for stroke risk factor optimization.

## 2024-04-02 NOTE — DISCHARGE NOTE PROVIDER - CARE PROVIDERS DIRECT ADDRESSES
,[email or web address] ,DirectAddress_Unknown,DirectAddress_Unknown

## 2024-04-02 NOTE — H&P ADULT - HISTORY OF PRESENT ILLNESS
Patient MICHAEL CARRAQSUILLO is a 63y (1960) RIGHT handed man who presents to [facility] ED on 4/1/2024, with c/o vision disturbance.    PMH significant for: HTN (non-compliant with medications), glaucoma, cataracts    Patient is accompanied by his mother and fiance. All are known to me. Patient previously presented to [facility] ED on 3/26/2024, at behest of Dr. Campa, for stroke w/u, after it was noted at an outpatient visit that patient had LHH. Patient underwent CTH and CTA H/N, which showed a RIGHT occipital infarct, as well as numerous chronic b/l infarcts. Patient also with significant ICAD. I attempted to complete neurology evaluation, but patient refused further evaluation and did not want to be admitted. Left AMA.    Today, patient returns. Says he is willing to stay for stroke workup. Patient reports that roughly one month prior to 3/26/2024 - he woke up and noticed spots in his vision. Denies ever experiencing weakness, numbness, speech disturbance, gait instability.  Patient had to wait 1 month to see Dr. Campa.  However, when he saw Dr. Campa, it was noted that he had a left homonymous hemianopsia.  Dr. Campa referred patient to the emergency department because there was concern for a stroke.  When I saw patient last, he was extremely agitated and did not want to stay for an examination.  Patient tells me that he feels his eyes are better today. No new complaints.  He reports that his only medical problem (other than stroke) is hypertension, but he does not take any of his antihypertensive medications.   Patient denies a history of cardiac arrhythmia.  His mother reports a history of brain aneurysm.  He denies knowing of a history of stroke or MI prior to his visual disturbance.  Patient is not on any blood thinners or aspirin.  He does not use any assistive devices at baseline. He is able to climb stairs and he drives.  Patient reports that he vapes.  Patient also reports that he drinks 10 bottles of beer per week.  He denies recreational drug use.  He lives with his fiancée.

## 2024-04-02 NOTE — ED ADULT NURSE NOTE - OBJECTIVE STATEMENT
pt is a 62yo male PMH HTN noncompliant with medications, glaucoma, cataracts presenting to the ED complaining of visual disturbances. pt reports "seeing spots" for a 2 week span of time about 1 month ago, was initially evaluated in [facility] ED, CTs showed mulitple TIAs, pt was supposed to stay for MRI but AMA'd, pt returning today for MRI. pt denies any visual disturbances at this time, denies occipital HA, unilateral weakness, pt reports a few episodes of lightheadedness this week while at work. pt A&Ox3 gross neuro intact, no difficulty speaking in complete sentences, pulses x 4, merino x4, abdomen soft nontender nondistended, skin intact. pt denies chest pain, sob, ha, n/v/d, abdominal pain, f/c, urinary symptoms, hematuria

## 2024-04-02 NOTE — ED PROVIDER NOTE - OBJECTIVE STATEMENT
63-year-old male past medical history of hypertension presents with visual disturbances for the last month.  Patient presented to the ER on March 26 with the same complaints, received CT heads and was found to have multiple stenoses and was recommended stay for.  Patient was seen by neuro at that time however patient chose to leave AMA. Patient has had no changes in symptoms. Currently does not have visual changes. Has not taken his BP meds for 5 years.
